# Patient Record
(demographics unavailable — no encounter records)

---

## 2020-01-02 NOTE — EDM.PDOC
ED HPI GENERAL MEDICAL PROBLEM





- General


Chief Complaint: General


Stated Complaint: MEDICAL CLEARANCE


Time Seen by Provider: 01/02/20 20:39


Source of Information: Reports: Patient


History Limitations: Reports: No Limitations





- History of Present Illness


INITIAL COMMENTS - FREE TEXT/NARRATIVE: 


HISTORY AND PHYSICAL:





History of present illness:


Patient is a 36 she'll female who presents to the emergency room by law 

enforcement for medical clearance.  Patient currently offers no concerns or 

complaints, she states she has chronic pain but doesn't have her usual pain 

medications because she is approximately 24 weeks pregnant. She reports she has 

had prenatal care with Dr Lydia Gaytan. She reports having a hard time as 

her dog recently passed away and is under a lot of stress. Enforcement states 

that they have placed a committal on her for mental health evaluation in 

[place]. They want her medically cleared for transport to [facility].  

Patient denies any fever, chills, headache, change in vision, syncope or near 

syncope. Denies any chest pain, back pain, shortness of breath or cough. Denies 

any abdominal pain, nausea, vomiting, diarrhea, constipation or dysuria. Has 

had vaginal discharge, but no vaginal bleeding/cramping. Has not noted any 

blood in urine or stool. Patient has been eating and drinking appropriately. 

Denies any alcohol or drug abuse.





Review of systems: 


As per history of present illness and below otherwise all systems reviewed and 

negative.





Past medical history: 


As per history of present illness and as reviewed below otherwise 

noncontributory.





Surgical history: 


As per history of present illness and as reviewed below otherwise 

noncontributory.





Social history: 


See social history for further information





Family history: 


As per history of present illness and as reviewed below otherwise 

noncontributory.





Physical exam:


General: Well-developed and well-nourished 36-year-old female. Alert and 

oriented. patient is hyperverbal and appears anxious although in no acute 

distress.


HEENT: Atraumatic, normocephalic, pupils equal and reactive bilaterally, 

negative for conjunctival pallor or scleral icterus, mucous membranes moist, 

TMs normal bilaterally, throat clear, neck supple, nontender, trachea midline. 

No drooling or trismus noted. No meningeal signs. No hot potato voice noted. 


Lungs: Clear to auscultation, breath sounds equal bilaterally, chest nontender.


Heart: S1S2, regular rate and rhythm without overt murmur


Abdomen: Soft, nondistended, nontender. Negative for masses or 

hepatosplenomegaly. Negative for costovertebral tenderness.


Skin: Intact, warm, dry. No lesions or rashes noted.


Extremities: Atraumatic, moves all extremities per self without difficulty or 

deficits, negative for cords or calf pain. Neurovascular unremarkable.


Neuro: Awake, alert, oriented. Cranial nerves II through XII unremarkable. 

Cerebellum unremarkable. Motor and sensory unremarkable throughout. Exam 

nonfocal.





Notes:


VSS. 's. Supportive care measures were reviewed and discussed. Voices 

understanding and is agreeable to plan of care. Denies any further questions or 

concerns at this time. D/c with law enforcement.





Diagnostics:


Fetal Heart Tone





Therapeutics:


None





Prescription:


None





Impression: 


Encounter for medical screening exam





Plan:


1. Please go to treatment facility that you have arranged for further 

psychiatric management.


2. Return to ED as needed and as discussed. 





Definitive disposition and diagnosis as appropriate pending reevaluation and 

review of above.





  ** chronic


Pain Score (Numeric/FACES): 10





- Related Data


 Allergies











Allergy/AdvReac Type Severity Reaction Status Date / Time


 


Sulfa (Sulfonamide Allergy  Swelling Verified 01/02/20 20:41





Antibiotics)     


 


tizanidine Allergy  Rash Verified 01/02/20 20:41











Home Meds: 


 Home Meds





tiZANidine [Zanaflex] 0 mg PO QID 01/02/20 [History]











ED ROS GENERAL





- Review of Systems


Review Of Systems: Comprehensive ROS is negative, except as noted in HPI.





ED EXAM, GENERAL





- Physical Exam


Exam: See Below (See dictation)





Course





- Vital Signs


Last Recorded V/S: 


 Last Vital Signs











Temp  97.8 F   01/02/20 20:42


 


Pulse  102 H  01/02/20 20:42


 


Resp  18   01/02/20 20:42


 


BP  136/78   01/02/20 20:42


 


Pulse Ox  97   01/02/20 20:42














Departure





- Departure


Time of Disposition: 20:51


Disposition: Home, Self-Care 01


Clinical Impression: 


 Encounter for medical screening examination








- Discharge Information


Referrals: 


Lydia Weathers CNM [Primary Care Provider] - 


Forms:  ED Department Discharge


Additional Instructions: 


The following information is given to patients seen in the emergency department 

who are being discharged to home. This information is to outline your options 

for follow-up care. We provide all patients seen in our emergency department 

with a follow-up referral.





The need for follow-up, as well as the timing and circumstances, are variable 

depending upon the specifics of your emergency department visit.





If you don't have a primary care physician on staff, we will provide you with a 

referral. We always advise you to contact your personal physician following an 

emergency department visit to inform them of the circumstance of the visit and 

for follow-up with them and/or the need for any referrals to a consulting 

specialist.





The emergency department will also refer you to a specialist when appropriate. 

This referral assures that you have the opportunity for follow-up care with a 

specialist. All of these measure are taken in an effort to provide you with 

optimal care, which includes your follow-up.





Under all circumstances we always encourage you to contact your private 

physician who remains a resource for coordinating your care. When calling for 

follow-up care, please make the office aware that this follow-up is from your 

recent emergency room visit. If for any reason you are refused follow-up, 

please contact the [facility] Emergency 

Department at [phone] and asked to speak to the emergency department 

charge nurse.





[facility]


Primary Care


[address] [place] [id number]


Phone: [phone]


Fax: [phone]





[facility]


[address] [place] [id number]


Phone: [phone]


Fax: [phone]





1. Please go to treatment facility that you have arranged for further 

psychiatric management.


2. Return to ED as needed and as discussed. 





Sepsis Event Note





- Evaluation


Sepsis Screening Result: No Definite Risk





- Focused Exam


Vital Signs: 


 Vital Signs











  Temp Pulse Resp BP Pulse Ox


 


 01/02/20 20:42  97.8 F  102 H  18  136/78  97











Date Exam was Performed: 01/02/20


Time Exam was Performed: 20:52